# Patient Record
Sex: MALE | Race: WHITE | NOT HISPANIC OR LATINO | Employment: UNEMPLOYED | URBAN - NONMETROPOLITAN AREA
[De-identification: names, ages, dates, MRNs, and addresses within clinical notes are randomized per-mention and may not be internally consistent; named-entity substitution may affect disease eponyms.]

---

## 2022-11-08 ENCOUNTER — HOSPITAL ENCOUNTER (EMERGENCY)
Facility: HOSPITAL | Age: 27
Discharge: HOME | End: 2022-11-08
Attending: EMERGENCY MEDICINE | Admitting: EMERGENCY MEDICINE

## 2022-11-08 ENCOUNTER — APPOINTMENT (OUTPATIENT)
Dept: CT IMAGING | Facility: HOSPITAL | Age: 27
End: 2022-11-08

## 2022-11-08 ENCOUNTER — APPOINTMENT (OUTPATIENT)
Dept: GENERAL RADIOLOGY | Facility: HOSPITAL | Age: 27
End: 2022-11-08

## 2022-11-08 VITALS
SYSTOLIC BLOOD PRESSURE: 121 MMHG | HEART RATE: 79 BPM | TEMPERATURE: 98.7 F | OXYGEN SATURATION: 98 % | RESPIRATION RATE: 16 BRPM | BODY MASS INDEX: 25.31 KG/M2 | DIASTOLIC BLOOD PRESSURE: 75 MMHG | HEIGHT: 73 IN | WEIGHT: 191 LBS

## 2022-11-08 DIAGNOSIS — J18.9 PNEUMONIA DUE TO INFECTIOUS ORGANISM, UNSPECIFIED LATERALITY, UNSPECIFIED PART OF LUNG: Primary | ICD-10-CM

## 2022-11-08 DIAGNOSIS — R11.2 NAUSEA AND VOMITING, UNSPECIFIED VOMITING TYPE: ICD-10-CM

## 2022-11-08 DIAGNOSIS — D72.829 LEUKOCYTOSIS, UNSPECIFIED TYPE: ICD-10-CM

## 2022-11-08 LAB
ALBUMIN SERPL-MCNC: 4.3 G/DL (ref 3.5–5.2)
ALBUMIN/GLOB SERPL: 1.2 G/DL
ALP SERPL-CCNC: 96 U/L (ref 39–117)
ALT SERPL W P-5'-P-CCNC: 15 U/L (ref 1–41)
ANION GAP SERPL CALCULATED.3IONS-SCNC: 11.2 MMOL/L (ref 5–15)
AST SERPL-CCNC: 17 U/L (ref 1–40)
BACTERIA UR QL AUTO: ABNORMAL /HPF
BASOPHILS # BLD AUTO: 0.06 10*3/MM3 (ref 0–0.2)
BASOPHILS NFR BLD AUTO: 0.2 % (ref 0–1.5)
BILIRUB SERPL-MCNC: 0.3 MG/DL (ref 0–1.2)
BILIRUB UR QL STRIP: NEGATIVE
BUN SERPL-MCNC: 15 MG/DL (ref 6–20)
BUN/CREAT SERPL: 16.9 (ref 7–25)
CALCIUM SPEC-SCNC: 8.8 MG/DL (ref 8.6–10.5)
CHLORIDE SERPL-SCNC: 100 MMOL/L (ref 98–107)
CLARITY UR: CLEAR
CO2 SERPL-SCNC: 24.8 MMOL/L (ref 22–29)
COLOR UR: YELLOW
CREAT SERPL-MCNC: 0.89 MG/DL (ref 0.76–1.27)
CRP SERPL-MCNC: 14.91 MG/DL (ref 0–0.5)
D-LACTATE SERPL-SCNC: 1 MMOL/L (ref 0.5–2)
DEPRECATED RDW RBC AUTO: 43.5 FL (ref 37–54)
EGFRCR SERPLBLD CKD-EPI 2021: 121.2 ML/MIN/1.73
EOSINOPHIL # BLD AUTO: 0.01 10*3/MM3 (ref 0–0.4)
EOSINOPHIL NFR BLD AUTO: 0 % (ref 0.3–6.2)
ERYTHROCYTE [DISTWIDTH] IN BLOOD BY AUTOMATED COUNT: 13.5 % (ref 12.3–15.4)
FLUAV RNA RESP QL NAA+PROBE: NOT DETECTED
FLUBV RNA RESP QL NAA+PROBE: NOT DETECTED
GLOBULIN UR ELPH-MCNC: 3.7 GM/DL
GLUCOSE SERPL-MCNC: 123 MG/DL (ref 65–99)
GLUCOSE UR STRIP-MCNC: NEGATIVE MG/DL
HCT VFR BLD AUTO: 44.4 % (ref 37.5–51)
HGB BLD-MCNC: 15.1 G/DL (ref 13–17.7)
HGB UR QL STRIP.AUTO: NEGATIVE
HOLD SPECIMEN: NORMAL
HOLD SPECIMEN: NORMAL
HYALINE CASTS UR QL AUTO: ABNORMAL /LPF
IMM GRANULOCYTES # BLD AUTO: 0.12 10*3/MM3 (ref 0–0.05)
IMM GRANULOCYTES NFR BLD AUTO: 0.5 % (ref 0–0.5)
KETONES UR QL STRIP: ABNORMAL
LEUKOCYTE ESTERASE UR QL STRIP.AUTO: NEGATIVE
LYMPHOCYTES # BLD AUTO: 1.9 10*3/MM3 (ref 0.7–3.1)
LYMPHOCYTES NFR BLD AUTO: 7.5 % (ref 19.6–45.3)
MCH RBC QN AUTO: 29.5 PG (ref 26.6–33)
MCHC RBC AUTO-ENTMCNC: 34 G/DL (ref 31.5–35.7)
MCV RBC AUTO: 86.9 FL (ref 79–97)
MONOCYTES # BLD AUTO: 2.74 10*3/MM3 (ref 0.1–0.9)
MONOCYTES NFR BLD AUTO: 10.8 % (ref 5–12)
NEUTROPHILS NFR BLD AUTO: 20.5 10*3/MM3 (ref 1.7–7)
NEUTROPHILS NFR BLD AUTO: 81 % (ref 42.7–76)
NITRITE UR QL STRIP: NEGATIVE
NRBC BLD AUTO-RTO: 0 /100 WBC (ref 0–0.2)
PH UR STRIP.AUTO: 6 [PH] (ref 5–8)
PLATELET # BLD AUTO: 281 10*3/MM3 (ref 140–450)
PMV BLD AUTO: 10.7 FL (ref 6–12)
POTASSIUM SERPL-SCNC: 3.9 MMOL/L (ref 3.5–5.2)
PROCALCITONIN SERPL-MCNC: 0.34 NG/ML (ref 0–0.25)
PROT SERPL-MCNC: 8 G/DL (ref 6–8.5)
PROT UR QL STRIP: ABNORMAL
RBC # BLD AUTO: 5.11 10*6/MM3 (ref 4.14–5.8)
RBC # UR STRIP: ABNORMAL /HPF
REF LAB TEST METHOD: ABNORMAL
SARS-COV-2 RNA RESP QL NAA+PROBE: NOT DETECTED
SODIUM SERPL-SCNC: 136 MMOL/L (ref 136–145)
SP GR UR STRIP: 1.02 (ref 1–1.03)
SQUAMOUS #/AREA URNS HPF: ABNORMAL /HPF
UROBILINOGEN UR QL STRIP: ABNORMAL
WBC # UR STRIP: ABNORMAL /HPF
WBC NRBC COR # BLD: 25.33 10*3/MM3 (ref 3.4–10.8)
WHOLE BLOOD HOLD COAG: NORMAL
WHOLE BLOOD HOLD SPECIMEN: NORMAL

## 2022-11-08 PROCEDURE — 25010000002 IOPAMIDOL 61 % SOLUTION: Performed by: EMERGENCY MEDICINE

## 2022-11-08 PROCEDURE — 96375 TX/PRO/DX INJ NEW DRUG ADDON: CPT

## 2022-11-08 PROCEDURE — 86140 C-REACTIVE PROTEIN: CPT | Performed by: EMERGENCY MEDICINE

## 2022-11-08 PROCEDURE — 85025 COMPLETE CBC W/AUTO DIFF WBC: CPT

## 2022-11-08 PROCEDURE — 99284 EMERGENCY DEPT VISIT MOD MDM: CPT

## 2022-11-08 PROCEDURE — 96365 THER/PROPH/DIAG IV INF INIT: CPT

## 2022-11-08 PROCEDURE — 25010000002 CEFTRIAXONE SODIUM-DEXTROSE 1-3.74 GM-%(50ML) RECONSTITUTED SOLUTION: Performed by: EMERGENCY MEDICINE

## 2022-11-08 PROCEDURE — 74177 CT ABD & PELVIS W/CONTRAST: CPT

## 2022-11-08 PROCEDURE — 84145 PROCALCITONIN (PCT): CPT | Performed by: EMERGENCY MEDICINE

## 2022-11-08 PROCEDURE — 80053 COMPREHEN METABOLIC PANEL: CPT

## 2022-11-08 PROCEDURE — 83605 ASSAY OF LACTIC ACID: CPT | Performed by: EMERGENCY MEDICINE

## 2022-11-08 PROCEDURE — 81001 URINALYSIS AUTO W/SCOPE: CPT | Performed by: EMERGENCY MEDICINE

## 2022-11-08 PROCEDURE — 87636 SARSCOV2 & INF A&B AMP PRB: CPT

## 2022-11-08 PROCEDURE — 25010000002 ONDANSETRON PER 1 MG: Performed by: EMERGENCY MEDICINE

## 2022-11-08 PROCEDURE — 87040 BLOOD CULTURE FOR BACTERIA: CPT

## 2022-11-08 PROCEDURE — 25010000002 KETOROLAC TROMETHAMINE PER 15 MG: Performed by: EMERGENCY MEDICINE

## 2022-11-08 PROCEDURE — 71045 X-RAY EXAM CHEST 1 VIEW: CPT

## 2022-11-08 RX ORDER — ONDANSETRON 2 MG/ML
4 INJECTION INTRAMUSCULAR; INTRAVENOUS ONCE
Status: COMPLETED | OUTPATIENT
Start: 2022-11-08 | End: 2022-11-08

## 2022-11-08 RX ORDER — DOXYCYCLINE 100 MG/1
100 CAPSULE ORAL ONCE
Status: COMPLETED | OUTPATIENT
Start: 2022-11-08 | End: 2022-11-08

## 2022-11-08 RX ORDER — SODIUM CHLORIDE 0.9 % (FLUSH) 0.9 %
10 SYRINGE (ML) INJECTION AS NEEDED
Status: DISCONTINUED | OUTPATIENT
Start: 2022-11-08 | End: 2022-11-08 | Stop reason: HOSPADM

## 2022-11-08 RX ORDER — DOXYCYCLINE 100 MG/1
100 CAPSULE ORAL 2 TIMES DAILY
Qty: 20 CAPSULE | Refills: 0 | Status: SHIPPED | OUTPATIENT
Start: 2022-11-08

## 2022-11-08 RX ORDER — KETOROLAC TROMETHAMINE 30 MG/ML
10 INJECTION, SOLUTION INTRAMUSCULAR; INTRAVENOUS ONCE
Status: COMPLETED | OUTPATIENT
Start: 2022-11-08 | End: 2022-11-08

## 2022-11-08 RX ORDER — PANTOPRAZOLE SODIUM 20 MG/1
20 TABLET, DELAYED RELEASE ORAL DAILY
Qty: 30 TABLET | Refills: 0 | Status: SHIPPED | OUTPATIENT
Start: 2022-11-08

## 2022-11-08 RX ORDER — CEFTRIAXONE 1 G/50ML
1 INJECTION, SOLUTION INTRAVENOUS ONCE
Status: COMPLETED | OUTPATIENT
Start: 2022-11-08 | End: 2022-11-08

## 2022-11-08 RX ORDER — CEFUROXIME AXETIL 500 MG/1
500 TABLET ORAL 2 TIMES DAILY
Qty: 20 TABLET | Refills: 0 | Status: SHIPPED | OUTPATIENT
Start: 2022-11-08

## 2022-11-08 RX ORDER — ONDANSETRON 4 MG/1
4 TABLET, ORALLY DISINTEGRATING ORAL EVERY 8 HOURS PRN
Qty: 12 TABLET | Refills: 0 | Status: SHIPPED | OUTPATIENT
Start: 2022-11-08

## 2022-11-08 RX ORDER — ACETAMINOPHEN 500 MG
1000 TABLET ORAL EVERY 6 HOURS PRN
Status: DISCONTINUED | OUTPATIENT
Start: 2022-11-08 | End: 2022-11-08 | Stop reason: HOSPADM

## 2022-11-08 RX ADMIN — KETOROLAC TROMETHAMINE 10 MG: 30 INJECTION, SOLUTION INTRAMUSCULAR; INTRAVENOUS at 07:36

## 2022-11-08 RX ADMIN — ONDANSETRON 4 MG: 2 INJECTION INTRAMUSCULAR; INTRAVENOUS at 07:38

## 2022-11-08 RX ADMIN — IOPAMIDOL 100 ML: 612 INJECTION, SOLUTION INTRAVENOUS at 07:57

## 2022-11-08 RX ADMIN — SODIUM CHLORIDE 1000 ML: 9 INJECTION, SOLUTION INTRAVENOUS at 07:36

## 2022-11-08 RX ADMIN — DOXYCYCLINE 100 MG: 100 CAPSULE ORAL at 09:09

## 2022-11-08 RX ADMIN — CEFTRIAXONE 1 G: 1 INJECTION, SOLUTION INTRAVENOUS at 09:17

## 2022-11-08 RX ADMIN — ACETAMINOPHEN 1000 MG: 500 TABLET, FILM COATED ORAL at 06:51

## 2022-11-08 NOTE — ED PROVIDER NOTES
TRIAGE CHIEF COMPLAINT:     Nursing and triage notes reviewed    Chief Complaint   Patient presents with   • Vomiting   • Fever   • Diarrhea   • Nausea      HPI: Hamilton Friend is a 27 y.o. male who presents to the emergency department complaining of a 3 to 4-day history of fever, nausea, vomiting, diarrhea.  Patient states symptoms have been worsening over the past several days.  He states he does have some body aches.  He states over the past 24 hours whenever he has vomited or had a diarrhea the stool or vomit appears to be dark.  He denies severe abdominal pain.  He denies shortness of breath, pain with breathing.  He states he has a mild cough.    REVIEW OF SYSTEMS: All other systems reviewed and are negative     PAST MEDICAL HISTORY:   Past Medical History:   Diagnosis Date   • Depression         FAMILY HISTORY:   Family History   Problem Relation Age of Onset   • No Known Problems Mother    • No Known Problems Father         SOCIAL HISTORY:   Social History     Socioeconomic History   • Marital status: Single   Tobacco Use   • Smoking status: Former     Types: Cigarettes     Quit date: 2021     Years since quittin.0   • Smokeless tobacco: Never   Substance and Sexual Activity   • Alcohol use: Not Currently   • Drug use: Never   • Sexual activity: Defer        SURGICAL HISTORY:   History reviewed. No pertinent surgical history.     CURRENT MEDICATIONS:      Medication List      ASK your doctor about these medications    FLUoxetine 20 MG capsule  Commonly known as: PROzac  Take 1 capsule by mouth Daily.  Ask about: Which instructions should I use?             ALLERGIES: Penicillins     PHYSICAL EXAM:   VITAL SIGNS:   Vitals:    22 0710   BP:    Pulse:    Resp:    Temp:    SpO2: 98%      CONSTITUTIONAL: Awake, oriented, appears nontoxic   HENT: Atraumatic, normocephalic, oral mucosa pink and moist, airway patent. Nares patent without drainage. External ears normal.   EYES: Conjunctivae clear   NECK:  Trachea midline, nontender, supple   CARDIOVASCULAR: Tachycardic with a regular rhythm, No murmurs, rubs, gallops   PULMONARY/CHEST: Clear to auscultation, no rhonchi, wheezes, or rales. Symmetrical breath sounds.  ABDOMINAL: Nondistended, soft, nontender - no rebound or guarding.  NEUROLOGIC: Nonfocal, moving all four extremities, no gross sensory or motor deficits.   EXTREMITIES: No clubbing, cyanosis, or edema   SKIN: Warm, Dry, No erythema, No rash     ED COURSE / MEDICAL DECISION MAKING:   Hamilton Friend is a 27 y.o. male who presents to the emergency department for evaluation of fever, chills, body aches, nausea, vomiting, diarrhea.  Patient is febrile to 101 on arrival in the emergency department.  Patient does not have any significant abdominal tenderness.  No abnormal lung sounds.    Will obtain labs, blood cultures, lactic acid, chest x-ray, CT scan of the abdomen and pelvis given his vomiting and diarrhea for further evaluation.    Patient was started on IV fluids and antinausea medication on arrival.  We will also provide antipyretics.    Patient was found to have a significantly elevated white blood cell count at 25.  Patient had a procalcitonin of 0.3.  Lactic acid was within the normal range.  CRP elevated at 14.  COVID and influenza screens are negative.    CT scan of the abdomen and pelvis per radiology interpretation does not reveal any obvious acute process.    Chest x-ray per radiology interpretation reveals evidence of a right sided pneumonia.    Further conversation with patient reveals that he has been taking Pepto-Bismol for his abdominal symptoms and this could explain why his stool is dark in color and given that his hemoglobin is in the normal range at 15.    Will initiate antibiotic therapy.  Patient has remained stable in the ER.  We will treat as an outpatient initially and advise 48-hour recheck with repeat labs at that time.  Patient was comfortable with this plan and discharged in good  condition.    DECISION TO DISCHARGE/ADMIT: see ED care timeline     FINAL IMPRESSION:   1 --pneumonia  2 --nausea and vomiting  3 --     Electronically signed by: Indira Keane MD, 11/8/2022 07:30 Indira Joseph MD  11/08/22 100

## 2022-11-13 LAB
BACTERIA SPEC AEROBE CULT: NORMAL
BACTERIA SPEC AEROBE CULT: NORMAL

## 2023-05-20 ENCOUNTER — APPOINTMENT (OUTPATIENT)
Dept: CT IMAGING | Facility: HOSPITAL | Age: 28
End: 2023-05-20
Payer: MEDICAID

## 2023-05-20 ENCOUNTER — HOSPITAL ENCOUNTER (EMERGENCY)
Facility: HOSPITAL | Age: 28
Discharge: HOME | End: 2023-05-20
Attending: EMERGENCY MEDICINE
Payer: MEDICAID

## 2023-05-20 VITALS
WEIGHT: 191 LBS | SYSTOLIC BLOOD PRESSURE: 165 MMHG | BODY MASS INDEX: 25.31 KG/M2 | OXYGEN SATURATION: 99 % | TEMPERATURE: 98.3 F | RESPIRATION RATE: 18 BRPM | DIASTOLIC BLOOD PRESSURE: 99 MMHG | HEIGHT: 73 IN | HEART RATE: 94 BPM

## 2023-05-20 DIAGNOSIS — R51.9 NONINTRACTABLE HEADACHE, UNSPECIFIED CHRONICITY PATTERN, UNSPECIFIED HEADACHE TYPE: Primary | ICD-10-CM

## 2023-05-20 PROCEDURE — 70450 CT HEAD/BRAIN W/O DYE: CPT

## 2023-05-20 PROCEDURE — 99282 EMERGENCY DEPT VISIT SF MDM: CPT

## 2023-05-20 PROCEDURE — 72125 CT NECK SPINE W/O DYE: CPT

## 2023-05-20 RX ORDER — BUTALBITAL, ACETAMINOPHEN AND CAFFEINE 50; 325; 40 MG/1; MG/1; MG/1
1 TABLET ORAL EVERY 6 HOURS PRN
Qty: 10 TABLET | Refills: 0 | Status: SHIPPED | OUTPATIENT
Start: 2023-05-20

## 2023-05-20 NOTE — ED PROVIDER NOTES
TRIAGE CHIEF COMPLAINT:     Nursing and triage notes reviewed    Chief Complaint   Patient presents with   • Headache     Pt states headache for the last 2 months but worse in the last 3 days. Pt feels like he is unable to function because of it. Pt states that it is the whole head, feels like a mass in the middle, Like the top of the mouth.       HPI: Hamilton Friend is a 28 y.o. male who presents to the emergency department complaining of a headache.  Patient states he has had a constant headache for approximately the past 2 months.  He describes a pounding headache with occasional tightness in his neck, primarily the right side.  He states if he turns his head to the right side it actually makes the pain better.  He states he has not had any changes in his vision, does feel like the roof of his mouth is numb.  He denies any weakness in facial muscles.  He is not had any fevers or chills and denies being sick recently.  He denies any numbness, tingling, or weakness in his extremities.  He has seen his primary care provider and has been placed on Prozac.  He has taken ibuprofen but states this does not help.  He states primarily he is wanting to make sure he does not have a stroke or a tumor.  Headache has not been worsening.  Headache is constant and not exacerbated by any particular thing that the patient can think of.    REVIEW OF SYSTEMS: All other systems reviewed and are negative     PAST MEDICAL HISTORY:   Past Medical History:   Diagnosis Date   • Depression         FAMILY HISTORY:   Family History   Problem Relation Age of Onset   • No Known Problems Mother    • No Known Problems Father         SOCIAL HISTORY:   Social History     Socioeconomic History   • Marital status: Single   Tobacco Use   • Smoking status: Former     Types: Cigarettes     Quit date: 2021     Years since quittin.5   • Smokeless tobacco: Never   Substance and Sexual Activity   • Alcohol use: Not Currently   • Drug use: Never   •  Sexual activity: Defer        SURGICAL HISTORY:   No past surgical history on file.     CURRENT MEDICATIONS:      Medication List      ASK your doctor about these medications    cefuroxime 500 MG tablet  Commonly known as: CEFTIN  Take 1 tablet by mouth 2 (Two) Times a Day.     doxycycline 100 MG capsule  Commonly known as: MONODOX  Take 1 capsule by mouth 2 (Two) Times a Day.     FLUoxetine 20 MG capsule  Commonly known as: PROzac  Take 1 capsule by mouth Daily.     ondansetron ODT 4 MG disintegrating tablet  Commonly known as: ZOFRAN-ODT  Place 1 tablet on the tongue Every 8 (Eight) Hours As Needed for Nausea or Vomiting.     pantoprazole 20 MG EC tablet  Commonly known as: PROTONIX  Take 1 tablet by mouth Daily.             ALLERGIES: Penicillins     PHYSICAL EXAM:   VITAL SIGNS:   Vitals:    05/20/23 1858   BP: 165/99   Pulse: 94   Resp: 18   Temp: 98.3 °F (36.8 °C)   SpO2: 99%      CONSTITUTIONAL: Awake, oriented, appears nontoxic   HENT: Atraumatic, normocephalic, oral mucosa pink and moist, airway patent. Nares patent without drainage. External ears normal.   EYES: Conjunctivae clear, EOMI, PERRL   NECK: Trachea midline, nontender, supple.  There is some tenderness in the bilateral paraspinal musculature.  There is no rash or swelling.  Patient easily able to touch his chin to his chest.  CARDIOVASCULAR: Normal heart rate, Normal rhythm, No murmurs, rubs, gallops   PULMONARY/CHEST: Clear to auscultation, no rhonchi, wheezes, or rales. Symmetrical breath sounds.  ABDOMINAL: Nondistended, soft, nontender - no rebound or guarding.  NEUROLOGIC: Nonfocal, moving all four extremities, no gross sensory or motor deficits.   EXTREMITIES: No clubbing, cyanosis, or edema   SKIN: Warm, Dry, No erythema, No rash     ED COURSE / MEDICAL DECISION MAKING:   Hamilton Friend is a 28 y.o. male who presents to the emergency department for evaluation of a headache.  Patient is nondistressed on arrival in the emergency department.   Patient has mildly elevated blood pressure but vital signs are otherwise stable.  Physical examination does reveal some tenderness in the paraspinal musculature without midline tenderness.    Differential diagnosis includes tension headache, migraine headache, muscle spasm among other etiologies.    CT scan of the head and cervical spine was ordered for further evaluation of the patient's presentation.    Diagnostic information from other sources: None    Interventions: None, patient has declined any medication and treatment of his headache.    Narrative: Presents with headache.  Symptoms ongoing for the past 2 months.  Patient has had no infectious symptoms such as fever or illness.  Given this in conjunction with exam I have no suspicion for meningitis at this time.  Patient has no neurological deficits on examination and I have a very low suspicion for acute CVA.  I given the amount of time symptoms have been ongoing I have ordered a CT scan of the head and cervical spine for further evaluation.    Re-evaluation: Patient is resting comfortably on repeat evaluation.  CT scans of the head and cervical spine per radiology interpretation do not reveal any obvious acute abnormality.  Discussed findings with the patient.  We will continue symptomatic management with outpatient follow-up.  We will refer to neurology for further evaluation.  Patient was comfortable with this plan.    Plan for disposition is discharged with outpatient follow-up    DECISION TO DISCHARGE/ADMIT: see ED care timeline     FINAL IMPRESSION:   1 --headache  2 --   3 --     Electronically signed by: Indira Keane MD, 5/20/2023 19:17 Indira Kemp MD  05/20/23 2042

## 2023-08-14 ENCOUNTER — OFFICE VISIT (OUTPATIENT)
Dept: NEUROLOGY | Facility: CLINIC | Age: 28
End: 2023-08-14
Payer: MEDICAID

## 2023-08-14 VITALS
DIASTOLIC BLOOD PRESSURE: 80 MMHG | HEIGHT: 73 IN | SYSTOLIC BLOOD PRESSURE: 132 MMHG | TEMPERATURE: 98.4 F | BODY MASS INDEX: 28.49 KG/M2 | HEART RATE: 84 BPM | WEIGHT: 215 LBS | OXYGEN SATURATION: 96 %

## 2023-08-14 DIAGNOSIS — R42 DIZZINESS: ICD-10-CM

## 2023-08-14 DIAGNOSIS — R51.9 CHRONIC INTRACTABLE HEADACHE, UNSPECIFIED HEADACHE TYPE: Primary | ICD-10-CM

## 2023-08-14 DIAGNOSIS — G89.29 CHRONIC INTRACTABLE HEADACHE, UNSPECIFIED HEADACHE TYPE: Primary | ICD-10-CM

## 2023-08-14 RX ORDER — AMITRIPTYLINE HYDROCHLORIDE 10 MG/1
10 TABLET, FILM COATED ORAL NIGHTLY
Qty: 30 TABLET | Refills: 4 | Status: SHIPPED | OUTPATIENT
Start: 2023-08-14

## 2023-08-14 NOTE — PROGRESS NOTES
"     New Patient Office Visit      Patient Name: Hamilton Friend  : 1995   MRN: [id number]     Referring Physician: Jose Howard MD    Chief Complaint:    Chief Complaint   Patient presents with    Establish Care     Migraines; patient reports 30/30 headache days; reports pain on right side of head; patient reports dizziness and color changes with headaches; patient reports light and noise sensitivity; patient has tried and failed Fioricet and Prozac; Normal CT        History of Present Illness: Hamilton Friend is a 28 y.o. male who is here today to establish care with Neurology.  He says he has headaches daily and feels like he is spinning. The pain \"never goes away\". He says he has been having headaches \"since \". He says his whole head hurts and has pain increased to the back of the rt head and he has twitching in the head as well. He has 30/30 headache days a month. He says his vision goes \"purple\" and this comes and goes most days. He says he has been having some neck pain as well.   He has not had an eye exam. Denies any head injuries.  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         Pertinent Medical History: anxiety/depresion    Subjective      Review of Systems:   Review of Systems   Eyes:  Positive for photophobia and visual disturbance.   Neurological:  Positive for dizziness and headache.     Past Medical History:   Past Medical History:   Diagnosis Date    Depression        Past Surgical History: History reviewed. No pertinent surgical history.    Family History:   Family History   Problem Relation Age of Onset    No Known Problems Mother     No Known Problems Father        Social History:   Social History     Socioeconomic History    Marital status: Single   Tobacco Use    Smoking status: Former     Types: Cigarettes     Quit date: 2021     Years since quittin.7    Smokeless tobacco: Current   Substance and " "Sexual Activity    Alcohol use: Not Currently    Drug use: Never    Sexual activity: Defer       Medications:     Current Outpatient Medications:     amitriptyline (ELAVIL) 10 MG tablet, Take 1 tablet by mouth Every Night., Disp: 30 tablet, Rfl: 4    Allergies:   Allergies   Allergen Reactions    Penicillins Swelling and Rash       Objective     Physical Exam:  Vital Signs:   Vitals:    08/14/23 1021   BP: 132/80   Pulse: 84   Temp: 98.4 øF (36.9 øC)   SpO2: 96%   Weight: 97.5 kg (215 lb)   Height: 185.4 cm (73\")   PainSc:   4   PainLoc: Head     Body mass index is 28.37 kg/mý.     Physical Exam  Constitutional:       Appearance: Normal appearance.   HENT:      Head: Normocephalic.   Eyes:      Extraocular Movements: EOM normal.      Conjunctiva/sclera: Conjunctivae normal.      Pupils: Pupils are equal, round, and reactive to light.   Pulmonary:      Effort: Pulmonary effort is normal.   Musculoskeletal:         General: Normal range of motion.   Skin:     General: Skin is warm and dry.   Neurological:      General: No focal deficit present.      Mental Status: He is alert and oriented to person, place, and time.      Cranial Nerves: Cranial nerves 2-12 are intact. No dysarthria.      Motor: Motor function is intact. No tremor or pronator drift.      Coordination: Coordination is intact. Romberg sign negative. Finger-Nose-Finger Test and Romberg Test normal.      Gait: Gait is intact. Tandem walk normal.      Deep Tendon Reflexes:      Reflex Scores:       Bicep reflexes are 2+ on the right side and 2+ on the left side.       Brachioradialis reflexes are 2+ on the right side and 2+ on the left side.       Patellar reflexes are 2+ on the right side and 2+ on the left side.  Psychiatric:         Attention and Perception: Attention normal.         Mood and Affect: Mood normal. Affect is flat.         Speech: Speech normal.         Behavior: Behavior normal. Behavior is cooperative.         Thought Content: Thought " content normal.         Cognition and Memory: Cognition normal.         Judgment: Judgment normal.       Neurologic Exam     Mental Status   Oriented to person, place, and time.   Speech: speech is normal     Cranial Nerves   Cranial nerves II through XII intact.     CN III, IV, VI   Pupils are equal, round, and reactive to light.  Extraocular motions are normal.   Right pupil: Size: 5 mm. Shape: regular. Reactivity: brisk.   Left pupil: Size: 5 mm. Shape: regular. Reactivity: brisk.   Nystagmus: none     CN XII   Tongue: not atrophic  Fasciculations: absent  Tongue deviation: none    Motor Exam     Strength   Right deltoid: 5/5  Left deltoid: 5/5  Right biceps: 5/5  Left biceps: 5/5  Right triceps: 5/5  Left triceps: 5/5  Right quadriceps: 5/5  Left quadriceps: 5/5  Right hamstrin/5  Right anterior tibial: 5/5  Left anterior tibial: 5/5  Right posterior tibial: 5/5  Left posterior tibial: 5/5    Gait, Coordination, and Reflexes     Gait  Gait: normal    Coordination   Romberg: negative  Finger to nose coordination: normal  Tandem walking coordination: normal    Tremor   Resting tremor: absent  Intention tremor: absent  Action tremor: absent    Reflexes   Right brachioradialis: 2+  Left brachioradialis: 2+  Right biceps: 2+  Left biceps: 2+  Right patellar: 2+  Left patellar: 2+    Assessment / Plan      Assessment/Plan:   Diagnoses and all orders for this visit:    1. Chronic intractable headache, unspecified headache type (Primary)  -     MRI Brain With & Without Contrast; Future  -     amitriptyline (ELAVIL) 10 MG tablet; Take 1 tablet by mouth Every Night.  Dispense: 30 tablet; Refill: 4    2. Dizziness  -     MRI Brain With & Without Contrast; Future    CT of the head without contrast and CT of the C-spine from 2023 were reviewed and noncontributory.  I also reviewed ED notes.    MRI with contrast has been ordered to rule out any demyelinating disease as well as consideration of vascular or structural  abnormality which may be contributing to his symptoms.  Patient is to get a dilated eye exam as we discussed.  I will start him on amitriptyline 10 mg at night as he also reports he is not taking the Prozac and never has and he is having trouble falling asleep at night. Indications and side effects discussed with patient she verbalizes understanding.  Patient will call in the interim if she has any questions or concerns or changes.    Follow Up:   Return in about 3 months (around 11/14/2023).    BASIA Lay, FNP-[facility]

## 2023-08-25 ENCOUNTER — PATIENT ROUNDING (BHMG ONLY) (OUTPATIENT)
Dept: NEUROLOGY | Facility: CLINIC | Age: 28
End: 2023-08-25
Payer: MEDICAID

## 2023-10-03 ENCOUNTER — HOSPITAL ENCOUNTER (OUTPATIENT)
Dept: MRI IMAGING | Facility: HOSPITAL | Age: 28
Discharge: HOME | End: 2023-10-03
Admitting: NURSE PRACTITIONER
Payer: MEDICAID

## 2023-10-03 DIAGNOSIS — G89.29 CHRONIC INTRACTABLE HEADACHE, UNSPECIFIED HEADACHE TYPE: ICD-10-CM

## 2023-10-03 DIAGNOSIS — R42 DIZZINESS: ICD-10-CM

## 2023-10-03 DIAGNOSIS — R51.9 CHRONIC INTRACTABLE HEADACHE, UNSPECIFIED HEADACHE TYPE: ICD-10-CM

## 2023-10-03 PROCEDURE — 0 GADOBENATE DIMEGLUMINE 529 MG/ML SOLUTION: Performed by: NURSE PRACTITIONER

## 2023-10-03 PROCEDURE — A9577 INJ MULTIHANCE: HCPCS | Performed by: NURSE PRACTITIONER

## 2023-10-03 PROCEDURE — 70553 MRI BRAIN STEM W/O & W/DYE: CPT

## 2023-10-03 RX ADMIN — GADOBENATE DIMEGLUMINE 20 ML: 529 INJECTION, SOLUTION INTRAVENOUS at 07:15

## 2023-11-13 ENCOUNTER — OFFICE VISIT (OUTPATIENT)
Dept: NEUROLOGY | Facility: CLINIC | Age: 28
End: 2023-11-13
Payer: MEDICAID

## 2023-11-13 VITALS
TEMPERATURE: 98.7 F | HEART RATE: 77 BPM | WEIGHT: 217 LBS | BODY MASS INDEX: 28.76 KG/M2 | OXYGEN SATURATION: 98 % | DIASTOLIC BLOOD PRESSURE: 80 MMHG | SYSTOLIC BLOOD PRESSURE: 128 MMHG | HEIGHT: 73 IN

## 2023-11-13 DIAGNOSIS — G44.209 TENSION HEADACHE: ICD-10-CM

## 2023-11-13 DIAGNOSIS — R51.9 CHRONIC INTRACTABLE HEADACHE, UNSPECIFIED HEADACHE TYPE: ICD-10-CM

## 2023-11-13 DIAGNOSIS — R41.840 LACK OF CONCENTRATION: ICD-10-CM

## 2023-11-13 DIAGNOSIS — R06.83 SNORES: Primary | ICD-10-CM

## 2023-11-13 DIAGNOSIS — G47.09 OTHER INSOMNIA: ICD-10-CM

## 2023-11-13 DIAGNOSIS — G47.9 RESTLESS SLEEPER: ICD-10-CM

## 2023-11-13 DIAGNOSIS — R68.89 FORGETFULNESS: ICD-10-CM

## 2023-11-13 DIAGNOSIS — G89.29 CHRONIC INTRACTABLE HEADACHE, UNSPECIFIED HEADACHE TYPE: ICD-10-CM

## 2023-11-13 DIAGNOSIS — R41.89 BRAIN FOG: ICD-10-CM

## 2023-11-13 PROCEDURE — 1159F MED LIST DOCD IN RCRD: CPT | Performed by: NURSE PRACTITIONER

## 2023-11-13 PROCEDURE — 99214 OFFICE O/P EST MOD 30 MIN: CPT | Performed by: NURSE PRACTITIONER

## 2023-11-13 PROCEDURE — 1160F RVW MEDS BY RX/DR IN RCRD: CPT | Performed by: NURSE PRACTITIONER

## 2023-11-13 RX ORDER — PROPRANOLOL HYDROCHLORIDE 10 MG/1
10 TABLET ORAL 2 TIMES DAILY
Qty: 60 TABLET | Refills: 6 | Status: SHIPPED | OUTPATIENT
Start: 2023-11-13

## 2023-11-13 NOTE — PROGRESS NOTES
"     Follow Up Office Visit      Patient Name: Hamilton Friend  : 1995   MRN: [id number]     Chief Complaint:    Chief Complaint   Patient presents with    Follow-up     Migraine; patient reports daily headaches; no change in symptoms       History of Present Illness: Hamilton Friend is a 28 y.o. male who is here today to follow up with migraines.He says he is having daily headaches and still not sleeping well.He wakes up with headaches.  They are generally right-sided but he says he does have some where he feels like he has a band around his head.  He also notes concerns with forgetfulness, brain fog, difficulty concentrating and difficulty staying on task and completing tasks.  He would like a further work-up in regards to this as his behavioral medicine provider told him they felt it was more neurological.  He did have an MRI of the brain with and without contrast on 10/3/2023 that was normal.   He does snore. Mallampati score 3-4  He is accompanied to office visit today by his girlfriend, Julisa.     History of Present Illness: Hamilton Friend is a 28 y.o. male who is here today to establish care with Neurology.  He says he has headaches daily and feels like he is spinning. The pain \"never goes away\". He says he has been having headaches \"since \". He says his whole head hurts and has pain increased to the back of the rt head and he has twitching in the head as well. He has 30/30 headache days a month. He says his vision goes \"purple\" and this comes and goes most days. He says he has been having some neck pain as well.   He has not had an eye exam. Denies any head injuries.  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            Pertinent Medical History: anxiety/depresion    Subjective      Review of Systems:   Review of Systems   Neurological:  Positive for headache and memory problem.       I have reviewed and the following portions of the " "patient's history were updated as appropriate: past family history, past medical history, past social history, past surgical history and problem list.    Medications:     Current Outpatient Medications:     amitriptyline (ELAVIL) 10 MG tablet, Take 1 tablet by mouth Every Night., Disp: 30 tablet, Rfl: 4    diclofenac (VOLTAREN) 50 MG EC tablet, Take 1 tablet by mouth 2 (Two) Times a Day As Needed (tension headache)., Disp: 60 tablet, Rfl: 5    propranolol (INDERAL) 10 MG tablet, Take 1 tablet by mouth 2 (Two) Times a Day., Disp: 60 tablet, Rfl: 6    Allergies:   Allergies   Allergen Reactions    Penicillins Swelling and Rash       Objective     Physical Exam:  Vital Signs:   Vitals:    11/13/23 0921   BP: 128/80   Pulse: 77   Temp: 98.7 °F (37.1 °C)   SpO2: 98%   Weight: 98.4 kg (217 lb)   Height: 185.4 cm (73\")   PainSc: 0-No pain     Body mass index is 28.63 kg/m².    Physical Exam  Constitutional:       Appearance: Normal appearance.   HENT:      Head: Normocephalic.   Eyes:      Conjunctiva/sclera: Conjunctivae normal.   Pulmonary:      Effort: Pulmonary effort is normal.   Musculoskeletal:         General: Normal range of motion.   Skin:     General: Skin is warm and dry.   Neurological:      General: No focal deficit present.      Mental Status: He is alert and oriented to person, place, and time.      Cranial Nerves: Cranial nerves 2-12 are intact. No dysarthria.      Motor: Motor function is intact. No tremor or pronator drift.      Coordination: Coordination is intact. Romberg sign negative. Finger-Nose-Finger Test normal.      Gait: Gait is intact.   Psychiatric:         Attention and Perception: Attention normal.         Mood and Affect: Mood and affect normal.         Speech: Speech normal.         Behavior: Behavior normal. Behavior is cooperative.         Thought Content: Thought content normal.         Cognition and Memory: Cognition normal.         Judgment: Judgment normal.         Neurologic Exam "     Mental Status   Oriented to person, place, and time.   Speech: speech is normal     Cranial Nerves   Cranial nerves II through XII intact.     Gait, Coordination, and Reflexes     Gait  Gait: normal    Coordination   Finger to nose coordination: normal       Assessment / Plan      Assessment/Plan:   Diagnoses and all orders for this visit:    1. Snores (Primary)  -     Polysomnography 4 or More Parameters; Future    2. BMI 28.0-28.9,adult  -     Polysomnography 4 or More Parameters; Future    3. Chronic intractable headache, unspecified headache type  -     propranolol (INDERAL) 10 MG tablet; Take 1 tablet by mouth 2 (Two) Times a Day.  Dispense: 60 tablet; Refill: 6  -     Polysomnography 4 or More Parameters; Future    4. Restless sleeper  -     Polysomnography 4 or More Parameters; Future    5. Other insomnia  -     Polysomnography 4 or More Parameters; Future  -     Ambulatory Referral to Psychology    6. Brain fog  -     Polysomnography 4 or More Parameters; Future  -     Ambulatory Referral to Psychology    7. Lack of concentration  -     Polysomnography 4 or More Parameters; Future  -     Ambulatory Referral to Psychology    8. Forgetfulness  -     Polysomnography 4 or More Parameters; Future  -     Ambulatory Referral to Psychology    9. Tension headache  -     Polysomnography 4 or More Parameters; Future  -     diclofenac (VOLTAREN) 50 MG EC tablet; Take 1 tablet by mouth 2 (Two) Times a Day As Needed (tension headache).  Dispense: 60 tablet; Refill: 5       This is a pleasant 28-year-old male patient here to follow-up on chronic daily headaches.  He will stop the amitriptyline and we will start propranolol 10 mg daily x1 week and then may increase to twice daily dosing if tolerated.Indications and side effects discussed with patient he verbalizes understanding.  He will monitor his blood pressure during initiation and titration as discussed with him and his girlfriend.   He notes great concern over his  lack of concentration, forgetfulness and brain fog that he has been having.  He is wondering if there is something else going on and is willing to undergo eval by the neuropsych.  Referral has been made for this as well as referral for sleep apnea as his girlfriend also states he snores a lot and is a restless sleeper.  She says that sometimes he also pauses in between breaths.  Patient will call in the interim if he has any questions or concerns or changes.    Follow Up:   Return in about 6 months (around 5/13/2024).    BASIA Lay, FNP-[facility]

## 2023-12-11 ENCOUNTER — TELEPHONE (OUTPATIENT)
Dept: NEUROLOGY | Facility: CLINIC | Age: 28
End: 2023-12-11
Payer: MEDICAID

## 2023-12-11 NOTE — TELEPHONE ENCOUNTER
PEER TO PEER ON PSG. DENIAL IS SCANNED INTO MEDIA.     PHONE NUMBER TO COMPLETE PEER TO PEER. [phone]    AUTH # [id number]  DECEMBER 18TH.     TIFFANIE HUMMEL/[employer].  [phone]

## 2024-01-08 DIAGNOSIS — R41.840 LACK OF CONCENTRATION: ICD-10-CM

## 2024-01-08 DIAGNOSIS — G89.29 CHRONIC INTRACTABLE HEADACHE, UNSPECIFIED HEADACHE TYPE: ICD-10-CM

## 2024-01-08 DIAGNOSIS — R06.83 SNORES: Primary | ICD-10-CM

## 2024-01-08 DIAGNOSIS — G47.9 RESTLESS SLEEPER: ICD-10-CM

## 2024-01-08 DIAGNOSIS — R51.9 CHRONIC INTRACTABLE HEADACHE, UNSPECIFIED HEADACHE TYPE: ICD-10-CM

## 2024-01-08 DIAGNOSIS — G47.09 OTHER INSOMNIA: ICD-10-CM
